# Patient Record
Sex: FEMALE | Race: WHITE | Employment: FULL TIME | URBAN - NONMETROPOLITAN AREA
[De-identification: names, ages, dates, MRNs, and addresses within clinical notes are randomized per-mention and may not be internally consistent; named-entity substitution may affect disease eponyms.]

---

## 2019-01-14 ENCOUNTER — HOSPITAL ENCOUNTER (OUTPATIENT)
Age: 34
Discharge: HOME | End: 2019-01-14
Payer: MEDICAID

## 2019-01-14 LAB
ALBUMIN SERPL-MCNC: 4.6 G/DL (ref 3.5–5.1)
ALP BLD-CCNC: 78 U/L (ref 38–126)
ALT SERPL-CCNC: 23 U/L (ref 11–66)
ANION GAP SERPL CALCULATED.3IONS-SCNC: 11 MEQ/L (ref 8–16)
AST SERPL-CCNC: 16 U/L (ref 5–40)
BILIRUB SERPL-MCNC: 0.3 MG/DL (ref 0.3–1.2)
BUN BLDV-MCNC: 11 MG/DL (ref 7–22)
CALCIUM SERPL-MCNC: 9.2 MG/DL (ref 8.5–10.5)
CHLORIDE BLD-SCNC: 102 MEQ/L (ref 98–111)
CHOLESTEROL, TOTAL: 240 MG/DL (ref 100–199)
CO2: 24 MEQ/L (ref 23–33)
CREAT SERPL-MCNC: 0.6 MG/DL (ref 0.4–1.2)
GFR SERPL CREATININE-BSD FRML MDRD: > 90 ML/MIN/1.73M2
GLUCOSE BLD-MCNC: 94 MG/DL (ref 70–108)
GLUCOSE FASTING: 94 MG/DL (ref 69–109)
HDLC SERPL-MCNC: 40 MG/DL
LDL CHOLESTEROL CALCULATED: 124 MG/DL
POTASSIUM SERPL-SCNC: 4 MEQ/L (ref 3.5–5.2)
SODIUM BLD-SCNC: 137 MEQ/L (ref 135–145)
TOTAL PROTEIN: 7.1 G/DL (ref 6.1–8)
TRIGL SERPL-MCNC: 379 MG/DL (ref 0–199)
TSH SERPL DL<=0.05 MIU/L-ACNC: 0.92 UIU/ML (ref 0.4–4.2)

## 2019-01-14 PROCEDURE — 36415 COLL VENOUS BLD VENIPUNCTURE: CPT

## 2019-01-14 PROCEDURE — 80061 LIPID PANEL: CPT

## 2019-01-14 PROCEDURE — 83525 ASSAY OF INSULIN: CPT

## 2019-01-14 PROCEDURE — 84443 ASSAY THYROID STIM HORMONE: CPT

## 2019-01-14 PROCEDURE — 80053 COMPREHEN METABOLIC PANEL: CPT

## 2019-01-15 LAB — INSULIN, RANDOM OR FASTING: NORMAL

## 2022-08-09 ENCOUNTER — HOSPITAL ENCOUNTER (OUTPATIENT)
Age: 37
Setting detail: SPECIMEN
Discharge: HOME | End: 2022-08-09

## 2022-08-09 ENCOUNTER — OFFICE VISIT (OUTPATIENT)
Dept: FAMILY MEDICINE CLINIC | Age: 37
End: 2022-08-09
Payer: COMMERCIAL

## 2022-08-09 VITALS
TEMPERATURE: 98.1 F | RESPIRATION RATE: 16 BRPM | HEART RATE: 94 BPM | WEIGHT: 242 LBS | HEIGHT: 65 IN | DIASTOLIC BLOOD PRESSURE: 78 MMHG | OXYGEN SATURATION: 98 % | BODY MASS INDEX: 40.32 KG/M2 | SYSTOLIC BLOOD PRESSURE: 106 MMHG

## 2022-08-09 DIAGNOSIS — Z00.8 ENCOUNTER FOR BIOMETRIC SCREENING: ICD-10-CM

## 2022-08-09 DIAGNOSIS — Z87.42 HISTORY OF PCOS: ICD-10-CM

## 2022-08-09 DIAGNOSIS — N91.2 AMENORRHEA: ICD-10-CM

## 2022-08-09 DIAGNOSIS — F41.9 ANXIETY: ICD-10-CM

## 2022-08-09 DIAGNOSIS — Z86.2 HISTORY OF VON WILLEBRAND'S DISEASE: ICD-10-CM

## 2022-08-09 DIAGNOSIS — Z00.00 ANNUAL PHYSICAL EXAM: Primary | ICD-10-CM

## 2022-08-09 PROCEDURE — 99385 PREV VISIT NEW AGE 18-39: CPT | Performed by: NURSE PRACTITIONER

## 2022-08-09 PROCEDURE — 36415 COLL VENOUS BLD VENIPUNCTURE: CPT | Performed by: NURSE PRACTITIONER

## 2022-08-09 SDOH — ECONOMIC STABILITY: FOOD INSECURITY: WITHIN THE PAST 12 MONTHS, THE FOOD YOU BOUGHT JUST DIDN'T LAST AND YOU DIDN'T HAVE MONEY TO GET MORE.: NEVER TRUE

## 2022-08-09 SDOH — ECONOMIC STABILITY: TRANSPORTATION INSECURITY
IN THE PAST 12 MONTHS, HAS LACK OF TRANSPORTATION KEPT YOU FROM MEETINGS, WORK, OR FROM GETTING THINGS NEEDED FOR DAILY LIVING?: NO

## 2022-08-09 SDOH — ECONOMIC STABILITY: TRANSPORTATION INSECURITY
IN THE PAST 12 MONTHS, HAS THE LACK OF TRANSPORTATION KEPT YOU FROM MEDICAL APPOINTMENTS OR FROM GETTING MEDICATIONS?: NO

## 2022-08-09 SDOH — ECONOMIC STABILITY: FOOD INSECURITY: WITHIN THE PAST 12 MONTHS, YOU WORRIED THAT YOUR FOOD WOULD RUN OUT BEFORE YOU GOT MONEY TO BUY MORE.: NEVER TRUE

## 2022-08-09 ASSESSMENT — PATIENT HEALTH QUESTIONNAIRE - PHQ9
SUM OF ALL RESPONSES TO PHQ9 QUESTIONS 1 & 2: 2
1. LITTLE INTEREST OR PLEASURE IN DOING THINGS: 0
SUM OF ALL RESPONSES TO PHQ QUESTIONS 1-9: 2
2. FEELING DOWN, DEPRESSED OR HOPELESS: 2

## 2022-08-09 ASSESSMENT — ENCOUNTER SYMPTOMS
DIARRHEA: 0
ABDOMINAL PAIN: 0
CHEST TIGHTNESS: 0
EYE DISCHARGE: 0
CONSTIPATION: 0
SHORTNESS OF BREATH: 0
RHINORRHEA: 0
COUGH: 0
VOMITING: 0

## 2022-08-09 ASSESSMENT — SOCIAL DETERMINANTS OF HEALTH (SDOH): HOW HARD IS IT FOR YOU TO PAY FOR THE VERY BASICS LIKE FOOD, HOUSING, MEDICAL CARE, AND HEATING?: NOT HARD AT ALL
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
## 2022-08-10 LAB
ABSOLUTE EOS #: 0.44 K/UL (ref 0–0.44)
ABSOLUTE IMMATURE GRANULOCYTE: 0.03 K/UL (ref 0–0.3)
ABSOLUTE LYMPH #: 3.17 K/UL (ref 1.1–3.7)
ABSOLUTE MONO #: 0.57 K/UL (ref 0.1–1.2)
ALBUMIN SERPL-MCNC: 4.1 G/DL (ref 3.5–5.2)
ALBUMIN/GLOBULIN RATIO: 1.3 (ref 1–2.5)
ALP BLD-CCNC: 72 U/L (ref 35–104)
ALT SERPL-CCNC: 44 U/L (ref 5–33)
ANION GAP SERPL CALCULATED.3IONS-SCNC: 14 MMOL/L (ref 9–17)
AST SERPL-CCNC: 26 U/L
BASOPHILS # BLD: 1 % (ref 0–2)
BASOPHILS ABSOLUTE: 0.06 K/UL (ref 0–0.2)
BILIRUB SERPL-MCNC: 0.26 MG/DL (ref 0.3–1.2)
BUN BLDV-MCNC: 11 MG/DL (ref 6–20)
CALCIUM SERPL-MCNC: 9.5 MG/DL (ref 8.6–10.4)
CHLORIDE BLD-SCNC: 103 MMOL/L (ref 98–107)
CHOLESTEROL/HDL RATIO: 7.5
CHOLESTEROL: 264 MG/DL
CO2: 21 MMOL/L (ref 20–31)
CREAT SERPL-MCNC: 0.58 MG/DL (ref 0.5–0.9)
EOSINOPHILS RELATIVE PERCENT: 5 % (ref 1–4)
ESTIMATED AVERAGE GLUCOSE: 105 MG/DL
ESTRADIOL LEVEL: 94.6 PG/ML (ref 27–314)
FOLLICLE STIMULATING HORMONE: 3.5 MIU/ML (ref 1.7–21.5)
GFR AFRICAN AMERICAN: >60 ML/MIN
GFR NON-AFRICAN AMERICAN: >60 ML/MIN
GFR SERPL CREATININE-BSD FRML MDRD: ABNORMAL ML/MIN/{1.73_M2}
GLUCOSE BLD-MCNC: 87 MG/DL (ref 70–99)
HBA1C MFR BLD: 5.3 % (ref 4–6)
HCG QUALITATIVE: NEGATIVE
HCG QUANTITATIVE: <1 MIU/ML
HCT VFR BLD CALC: 46.2 % (ref 36.3–47.1)
HDLC SERPL-MCNC: 35 MG/DL
HEMOGLOBIN: 15.3 G/DL (ref 11.9–15.1)
IMMATURE GRANULOCYTES: 0 %
INSULIN REFERENCE RANGE:: NORMAL
INSULIN: 16.5 MU/L
LDL CHOLESTEROL DIRECT: 129 MG/DL
LDL CHOLESTEROL: ABNORMAL MG/DL (ref 0–130)
LH: 5.5 MIU/ML (ref 1–95.6)
LYMPHOCYTES # BLD: 38 % (ref 24–43)
MCH RBC QN AUTO: 31.5 PG (ref 25.2–33.5)
MCHC RBC AUTO-ENTMCNC: 33.1 G/DL (ref 28.4–34.8)
MCV RBC AUTO: 95.1 FL (ref 82.6–102.9)
MONOCYTES # BLD: 7 % (ref 3–12)
NRBC AUTOMATED: 0 PER 100 WBC
PDW BLD-RTO: 12.8 % (ref 11.8–14.4)
PLATELET # BLD: 193 K/UL (ref 138–453)
PMV BLD AUTO: 12.5 FL (ref 8.1–13.5)
POTASSIUM SERPL-SCNC: 4.7 MMOL/L (ref 3.7–5.3)
RBC # BLD: 4.86 M/UL (ref 3.95–5.11)
SEG NEUTROPHILS: 49 % (ref 36–65)
SEGMENTED NEUTROPHILS ABSOLUTE COUNT: 4.11 K/UL (ref 1.5–8.1)
SEX HORMONE BINDING GLOBULIN: 29 NMOL/L (ref 30–135)
SODIUM BLD-SCNC: 138 MMOL/L (ref 135–144)
TESTOSTERONE FREE-NONMALE: 1.5 PG/ML (ref 1.3–9.2)
TESTOSTERONE TOTAL: 8 NG/DL (ref 20–70)
TOTAL PROTEIN: 7.2 G/DL (ref 6.4–8.3)
TRIGL SERPL-MCNC: 458 MG/DL
TSH SERPL DL<=0.05 MIU/L-ACNC: 1.89 UIU/ML (ref 0.3–5)
WBC # BLD: 8.4 K/UL (ref 3.5–11.3)

## 2022-08-23 ENCOUNTER — OFFICE VISIT (OUTPATIENT)
Dept: FAMILY MEDICINE CLINIC | Age: 37
End: 2022-08-23
Payer: COMMERCIAL

## 2022-08-23 VITALS
TEMPERATURE: 97.2 F | BODY MASS INDEX: 39.74 KG/M2 | HEART RATE: 90 BPM | DIASTOLIC BLOOD PRESSURE: 82 MMHG | OXYGEN SATURATION: 98 % | WEIGHT: 237 LBS | RESPIRATION RATE: 16 BRPM | SYSTOLIC BLOOD PRESSURE: 124 MMHG

## 2022-08-23 DIAGNOSIS — Z87.42 HISTORY OF PCOS: ICD-10-CM

## 2022-08-23 DIAGNOSIS — F41.9 ANXIETY: ICD-10-CM

## 2022-08-23 DIAGNOSIS — Z86.2 HISTORY OF VON WILLEBRAND'S DISEASE: ICD-10-CM

## 2022-08-23 DIAGNOSIS — E78.1 HIGH TRIGLYCERIDES: ICD-10-CM

## 2022-08-23 DIAGNOSIS — N92.6 IRREGULAR MENSTRUAL CYCLE: ICD-10-CM

## 2022-08-23 PROCEDURE — 99214 OFFICE O/P EST MOD 30 MIN: CPT | Performed by: NURSE PRACTITIONER

## 2022-08-23 PROCEDURE — G8417 CALC BMI ABV UP PARAM F/U: HCPCS | Performed by: NURSE PRACTITIONER

## 2022-08-23 PROCEDURE — 4004F PT TOBACCO SCREEN RCVD TLK: CPT | Performed by: NURSE PRACTITIONER

## 2022-08-23 PROCEDURE — G8427 DOCREV CUR MEDS BY ELIG CLIN: HCPCS | Performed by: NURSE PRACTITIONER

## 2022-08-23 RX ORDER — METAPROTERENOL SULFATE 10 MG
1 TABLET ORAL DAILY
Qty: 90 CAPSULE | Refills: 0 | Status: SHIPPED | OUTPATIENT
Start: 2022-08-23

## 2022-08-23 ASSESSMENT — ENCOUNTER SYMPTOMS
VOMITING: 0
CHEST TIGHTNESS: 0
ABDOMINAL PAIN: 0
DIARRHEA: 0
COUGH: 0
SHORTNESS OF BREATH: 0
CONSTIPATION: 0
RHINORRHEA: 0
EYE DISCHARGE: 0

## 2022-08-23 NOTE — PROGRESS NOTES
pain and palpitations. Gastrointestinal:  Negative for abdominal pain, constipation, diarrhea and vomiting. Genitourinary:  Negative for difficulty urinating and hematuria. Musculoskeletal:  Negative for arthralgias and myalgias. Skin:  Negative for rash. Neurological:  Negative for dizziness, weakness, numbness and headaches. Psychiatric/Behavioral:  The patient is nervous/anxious. Allergies   Allergen Reactions    Clindamycin/Lincomycin Hives and Nausea And Vomiting    Codeine Hives and Nausea And Vomiting       No outpatient medications prior to visit. No facility-administered medications prior to visit. Past Medical History:   Diagnosis Date    Idiopathic hypotension     Von Willebrand disease ([facility] [address]         Social History     Tobacco Use    Smoking status: Some Days     Packs/day: 0.25     Years: 11.00     Pack years: 2.75     Types: Cigarettes     Start date: 2011    Smokeless tobacco: Never   Substance Use Topics    Alcohol use: Not Currently        Past Surgical History:   Procedure Laterality Date    TONSILLECTOMY      WISDOM TOOTH EXTRACTION  2001       Family History   Problem Relation Age of Onset    Hypotension Father     Breast Cancer Paternal Grandmother        Objective       /82   Pulse 90   Temp 97.2 °F (36.2 °C) (Temporal)   Resp 16   Wt 237 lb (107.5 kg)   LMP 08/18/2022 (Exact Date)   SpO2 98%   BMI 39.74 kg/m²   Physical Exam  Vitals reviewed. Constitutional:       Appearance: She is well-developed. HENT:      Head: Normocephalic and atraumatic. Right Ear: External ear normal.      Left Ear: External ear normal.   Eyes:      Conjunctiva/sclera: Conjunctivae normal.   Neck:      Thyroid: No thyromegaly. Trachea: Trachea normal.   Cardiovascular:      Rate and Rhythm: Normal rate and regular rhythm. Heart sounds: Normal heart sounds. No murmur heard. No friction rub. No gallop.    Pulmonary:      Effort: Pulmonary effort is normal. Breath sounds: Normal breath sounds. Abdominal:      General: Bowel sounds are normal.      Palpations: Abdomen is soft. Tenderness: There is no abdominal tenderness. Musculoskeletal:         General: Normal range of motion. Cervical back: Normal range of motion and neck supple. No spinous process tenderness. Skin:     General: Skin is warm and dry. Findings: No erythema or rash. Neurological:      Mental Status: She is alert and oriented to person, place, and time. Psychiatric:         Speech: Speech normal.         Behavior: Behavior normal.         Thought Content: Thought content normal.         Data Reviewed and Summarized       Labs: Insulin, total  Order: [id number]  Status: Final result    Visible to patient: No (not released)    Next appt: Today at 11:00 AM in Family Medicine (Mon[place] , APRN - CNP)    0 Result Notes  Component Ref Range & Units 22 0003    Insulin mU/L 16.5    Insulin Reference Range:          Comment: Fastin.6-24.9   30 min:     60 min:  29-88   90 min:  26-84   120 min: [address]              Specimen Collected: 22 00:03 EDT Last Resulted: 08/10/22 06:07 EDT            Contains abnormal data Testosterone, Free  Order: [id number]  Status: Final result    Visible to patient: No (not released)    Next appt: Today at 11:00 AM in [facility] (Montegut , APRN - CNP)    0 Result Notes  Component Ref Range & Units 22 0003    Testosterone 20 - 70 ng/dL 8 Low     Sex Hormone Binding 30 - 135 nmol/L 29 Low     Testosterone, Free 1.3 - 9.2 pg/mL 1.5    Comment:  The concentration of free testosterone is derived from a mathematical expression based on   the constant for the binding of testosterone to albumin and/or sex hormone binding globulin.           [address] [facility]              Specimen Collected: 22 00:03 EDT Last Resulted: 08/10/22 10:04 EDT        Lab Flowsheet     Order Details     View Encounter           Contains abnormal data LDL Cholesterol, Direct  Order: [id number]  Status: Final result    Visible to patient: No (not released)    Next appt: Today at 11:00 AM in Family Medicine (Paras Mention, APRN - CNP)    0 Result Notes  Component Ref Range & Units 8/9/22 0003    LDL Direct <100 mg/dL 129 High     [address] [facility]              Specimen Collected: 08/09/22 00:03 EDT Last Resulted: 08/10/22 05:03 EDT        Lab Flowsheet     Order Details     View Encounter     Lab and Collection Details     Routing     Result History     View Encounter Conversation           Result Care Coordination      Patient Communication     Not Released         Contains abnormal data Lipid Panel  Order: [id number]  Status: Edited Result - FINAL    Visible to patient: No (not released)    Next appt: Today at 11:00 AM in Family Medicine (Paras Song, APRN - CNP)    Dx: Encounter for biometric screening    0 Result Notes  Component Ref Range & Units 8/9/22 0003 1/14/19 1448   Cholesterol <200 mg/dL 264 High      Comment:     Cholesterol Guidelines:       <200  Desirable    200-240  Borderline       >240  Undesirable        HDL >40 mg/dL 35 Low   40 R, CM    Comment:     HDL Guidelines:     <40     Undesirable    40-59    Borderline     >59     Desirable        LDL Cholesterol 0 - 130 mg/dL          Comment: Calculation not valid for Triglyceride value greater than 400 mg/dL. Direct LDL reflexed             LDL Guidelines:      <100    Desirable    100-129   Near to/above Desirable    130-159   Borderline       >159   Undesirable       Direct (measured) LDL and calculated LDL are not interchangeable tests.     Chol/HDL Ratio <5 7.5 High      Comment:        Triglycerides <150 mg/dL 458 High   379 High  R, CM    Comment:     Triglyceride Guidelines:      <150   Desirable    150-199  Borderline    200-499  High      >499   Very high    Based on AHA Guidelines for fasting triglyceride, October 2012. Cholesterol, Total   240 High  R, CM    LDL Calculated   124 R, CM    [address] [place] [id number] [facility]              Specimen Collected: 08/09/22 00:03 EDT Last          Contains abnormal data CBC with Auto Differential  Order: [id number]  Status: Final result    Visible to patient: No (not released)    Next appt: Today at 11:00 AM in [facility] (Oskar Nichols, APRN - CNP)    Dx: Encounter for biometric screening    0 Result Notes  Component Ref Range & Units 8/9/22 0003    WBC 3.5 - 11.3 k/uL 8.4    RBC 3.95 - 5.11 m/uL 4.86    Hemoglobin 11.9 - 15.1 g/dL 15.3 High     Hematocrit 36.3 - 47.1 % 46.2    MCV 82.6 - 102.9 fL 95.1    MCH 25.2 - 33.5 pg 31.5    MCHC 28.4 - 34.8 g/dL 33.1    RDW 11.8 - 14.4 % 12.8    Platelets 862 - 348 k/uL 193    MPV 8.1 - 13.5 fL 12.5    NRBC Automated 0.0 per 100 WBC 0.0    Seg Neutrophils 36 - 65 % 49    Lymphocytes 24 - 43 % 38    Monocytes 3 - 12 % 7    Eosinophils % 1 - 4 % 5 High     Basophils 0 - 2 % 1    Immature Granulocytes 0 % 0    Segs Absolute 1.50 - 8.10 k/uL 4.11    Absolute Lymph # 1.10 - 3.70 k/uL 3.17    Absolute Mono # 0.10 - 1.20 k/uL 0.57    Absolute Eos # 0.00 - 0.44 k/uL 0.44    Basophils Absolute 0.00 - 0.20 k/uL 0.06    Absolute Immature Granulocyte 0.00 - 0.30 k/uL 0.03    [address] [facility]              Specimen Collected: 08/09/22 00:03 EDT Last Resulted: 08/10/22 02:47 EDT        Lab Flowsheet     Order Details     View Encounter     Lab and Collection Details     Routing     Result History     View Encounter Conversation           Result Care Coordination      Patient Communication     Not Released  Not seen Back to Top           Testing Performed By:    Lab      Contains abnormal data Comprehensive Metabolic Panel  Order: [id number]  Status: Final result    Visible to patient: No (not released)    Next appt:  Today at 11:00 AM in [facility] (FIDEL Montes De Oca - CNP)    Dx: Encounter for biometric screening    0 Result Notes  Component Ref Range & Units 8/9/22 0003 1/14/19 1448 1/14/19 1448   Glucose 70 - 99 mg/dL 87   94 R    BUN 6 - 20 mg/dL 11   11 R    Creatinine 0.50 - 0.90 mg/dL 0.58   0.6 R    Calcium 8.6 - 10.4 mg/dL 9.5   9.2 R    Sodium 135 - 144 mmol/L 138   137 R    Potassium 3.7 - 5.3 mmol/L 4.7   4.0 R    Chloride 98 - 107 mmol/L 103   102 R    CO2 20 - 31 mmol/L 21   24 R    Anion Gap 9 - 17 mmol/L 14  11.0 R, CM     Alkaline Phosphatase 35 - 104 U/L 72   78 R    ALT 5 - 33 U/L 44 High    23 R, CM    AST <32 U/L 26   16 R    Total Bilirubin 0.3 - 1.2 mg/dL 0.26 Low    0.3    Total Protein 6.4 - 8.3 g/dL 7.2   7.1 R    Albumin 3.5 - 5.2 g/dL 4.1   4.6 R    Albumin/Globulin Ratio 1.0 - 2.5 1.3      GFR Non-African American >60 mL/min >60      GFR  >60 mL/min >60      GFR Comment            Comment: Average GFR for 30-36 years old:    107 mL/min/1.73sq m   Chronic Kidney Disease:    <60 mL/min/1.73sq m   Kidney failure:    <15 mL/min/1.73sq m               eGFR calculated using average adult body mass. Additional eGFR calculator available at:         [employer]          [address]        Hemoglobin A1C  Order: [id number]  Status: Final result    Visible to patient: No (not released)    Next appt: Today at 11:00 AM in Family Medicine (Hans YanelisFIDEL mendieta - CNP)    Dx: Encounter for biometric screening    0 Result Notes  Component Ref Range & Units 8/9/22 0003    Hemoglobin A1C 4.0 - 6.0 % 5.3    Estimated Avg Glucose mg/dL 105    Comment: The ADA and AACC recommend providing the estimated average glucose result to permit better   patient understanding of their HBA1c result.     [address] [facility]              Specimen Collected: 08/09/22 00:03 EDT Last Resulted: 08/10/22 08:37 EDT            TSH With Reflex Ft4  Order: [id number]  Status: Final result    Visible to patient: No (not released)    Next appt: Today at 11:00 AM in Family Medicine (FIDEL Waggoner - CNP)    Dx: Amenorrhea    0 Result Notes  Component Ref Range & Units 8/9/22 0003 1/14/19 1448   TSH 0.30 - 5.00 uIU/mL 1.89  0.921 R, CM    Resulting Agency  [address] [facility]              Specimen Collected: 08/09/22 00:03 EDT Last Resulted: 08/10/22 04:36 EDT            Follicle Stimulating Hormone  Order: [id number]  Status: Final result    Visible to patient: No (not released)    Next appt: Today at 11:00 AM in Family Medicine (FIDEL Waggoner - CNP)    Dx: Amenorrhea    0 Result Notes  Component Ref Range & Units 8/9/22 0003    FSH 1.7 - 21.5 mIU/mL 3.5    Comment: Reference Range:   Male:                        1. 5-12.4   Ovulating Female: Follicular Phase           3.5-12.5     Ovulation Phase            4.7-21.5     Luteal Phase               1.7-7.7   Postmenopausal Female:      25.8-134.8    [address] [facility]              Specimen Collected: 08/09/22 00:03 EDT         Luteinizing Hormone  Order: [id number]  Status: Final result    Visible to patient: No (not released)    Next appt: Today at 11:00 AM in Family [facility] (FIDEL Waggoner - CNP)    Dx: Amenorrhea    0 Result Notes  Component Ref Range & Units 8/9/22 0003    LH 1.0 - 95.6 mIU/mL 5.5    Comment: Reference Range:   Male:                        1.7-8.6   Ovulating Female: Follicular Phase           2.4-12.6     Ovulation Phase           14.0-95.6     Luteal Phase               1.0-11.4   Postmenopausal Female:       7.7-58.5       Estradiol  Order: [id number]  Status: Final result    Visible to patient: No (not released)    Next appt:  Today at 11:00 AM in Family Medicine (FIDEL Waggoner - CNP)    Dx: Amenorrhea    0 Result Notes  Component Ref Range & Units 8/9/22 0003    Estradiol 27 - 314 pg/mL 94.6    Comment: FEMALES:   Normally menstruating   Luteal phase                  Follicular phase              Midcycle phase                Postmenopausal (untreated)  5-50         Fulvestrant treatment will show an increased estradiol concentration with this methodology. Alternate methodologies are available upon request.             HCG, Quantitative, Pregnancy  Order: [id number]  Status: Final result    Visible to patient: No (not released)    Next appt: Today at 11:00 AM in Family Medicine (FIEDL Jade CNP)    Dx: Amenorrhea    0 Result Notes  Component Ref Range & Units 8/9/22 0003    hCG Quant <5 mIU/mL <1    Comment:     Non-preg premeno   <=5   Postmeno           <=8   Male               <=3   If HCG results do not concur with clinical observations, additional testing to confirm   results is recommended. Resulting Agency        HCG, Serum, Qualitative  Order: [id number]  Status: Final result    Visible to patient: No (not released)    Next appt: Today at 11:00 AM in [facility] (FIDEL Jade CNP)    Dx: Amenorrhea    0 Result Notes  Component Ref Range & Units 8/9/22 0003    hCG Qual NEGATIVE NEGATIVE    Comment: Specimens with hCG levels near the threshold of the test (25 mIU/mL) may give a negative or   indeterminate result. In such cases, another test should be performed with a new specimen   in 48-72 hours. If early pregnancy is suspected clinically in this setting, correlation   with quantitative serum b-hCG level is suggested. [facility] has confirmed the use of plasma for this test. This has not been cleared   or approved by the U.S. Food and Drug Administration. The FDA has determined that such   clearance is not necessary.     [address] [facility]              Specimen Collected: 08/09/22 00:03 EDT Last Resulted: 08/10/22 03:58 EDT        Lab Flowsheet                Imaging/Testing:        FIDEL Jade CNP

## 2023-06-19 ENCOUNTER — OFFICE VISIT (OUTPATIENT)
Dept: FAMILY MEDICINE CLINIC | Age: 38
End: 2023-06-19
Payer: COMMERCIAL

## 2023-06-19 ENCOUNTER — ANCILLARY PROCEDURE (OUTPATIENT)
Dept: FAMILY MEDICINE CLINIC | Age: 38
End: 2023-06-19
Payer: COMMERCIAL

## 2023-06-19 VITALS
DIASTOLIC BLOOD PRESSURE: 68 MMHG | BODY MASS INDEX: 39.07 KG/M2 | OXYGEN SATURATION: 98 % | WEIGHT: 233 LBS | HEART RATE: 89 BPM | SYSTOLIC BLOOD PRESSURE: 124 MMHG | TEMPERATURE: 98.8 F | RESPIRATION RATE: 18 BRPM

## 2023-06-19 DIAGNOSIS — L40.9 PSORIASIS: ICD-10-CM

## 2023-06-19 DIAGNOSIS — Z00.8 ENCOUNTER FOR BIOMETRIC SCREENING: ICD-10-CM

## 2023-06-19 DIAGNOSIS — N92.6 IRREGULAR MENSTRUAL CYCLE: ICD-10-CM

## 2023-06-19 DIAGNOSIS — M25.551 RIGHT HIP PAIN: ICD-10-CM

## 2023-06-19 DIAGNOSIS — M25.551 RIGHT HIP PAIN: Primary | ICD-10-CM

## 2023-06-19 DIAGNOSIS — Z87.42 HISTORY OF PCOS: ICD-10-CM

## 2023-06-19 PROCEDURE — 73502 X-RAY EXAM HIP UNI 2-3 VIEWS: CPT

## 2023-06-19 PROCEDURE — 4004F PT TOBACCO SCREEN RCVD TLK: CPT | Performed by: NURSE PRACTITIONER

## 2023-06-19 PROCEDURE — 99214 OFFICE O/P EST MOD 30 MIN: CPT | Performed by: NURSE PRACTITIONER

## 2023-06-19 PROCEDURE — G8417 CALC BMI ABV UP PARAM F/U: HCPCS | Performed by: NURSE PRACTITIONER

## 2023-06-19 PROCEDURE — G8427 DOCREV CUR MEDS BY ELIG CLIN: HCPCS | Performed by: NURSE PRACTITIONER

## 2023-06-19 RX ORDER — BACLOFEN 10 MG/1
10 TABLET ORAL 3 TIMES DAILY
Qty: 90 TABLET | Refills: 0 | Status: SHIPPED | OUTPATIENT
Start: 2023-06-19 | End: 2023-07-19

## 2023-06-19 RX ORDER — CHLORHEXIDINE GLUCONATE 0.12 MG/ML
RINSE ORAL
COMMUNITY
Start: 2023-06-14

## 2023-06-19 RX ORDER — TRIAMCINOLONE ACETONIDE 0.25 MG/G
CREAM TOPICAL
Qty: 80 G | Refills: 3 | Status: SHIPPED | OUTPATIENT
Start: 2023-06-19

## 2023-06-19 SDOH — ECONOMIC STABILITY: FOOD INSECURITY: WITHIN THE PAST 12 MONTHS, YOU WORRIED THAT YOUR FOOD WOULD RUN OUT BEFORE YOU GOT MONEY TO BUY MORE.: NEVER TRUE

## 2023-06-19 SDOH — ECONOMIC STABILITY: HOUSING INSECURITY
IN THE LAST 12 MONTHS, WAS THERE A TIME WHEN YOU DID NOT HAVE A STEADY PLACE TO SLEEP OR SLEPT IN A SHELTER (INCLUDING NOW)?: NO

## 2023-06-19 SDOH — ECONOMIC STABILITY: FOOD INSECURITY: WITHIN THE PAST 12 MONTHS, THE FOOD YOU BOUGHT JUST DIDN'T LAST AND YOU DIDN'T HAVE MONEY TO GET MORE.: NEVER TRUE

## 2023-06-19 SDOH — ECONOMIC STABILITY: INCOME INSECURITY: HOW HARD IS IT FOR YOU TO PAY FOR THE VERY BASICS LIKE FOOD, HOUSING, MEDICAL CARE, AND HEATING?: NOT HARD AT ALL

## 2023-06-19 ASSESSMENT — PATIENT HEALTH QUESTIONNAIRE - PHQ9
SUM OF ALL RESPONSES TO PHQ QUESTIONS 1-9: 0
SUM OF ALL RESPONSES TO PHQ9 QUESTIONS 1 & 2: 0
1. LITTLE INTEREST OR PLEASURE IN DOING THINGS: 0
SUM OF ALL RESPONSES TO PHQ QUESTIONS 1-9: 0
2. FEELING DOWN, DEPRESSED OR HOPELESS: 0
SUM OF ALL RESPONSES TO PHQ QUESTIONS 1-9: 0
SUM OF ALL RESPONSES TO PHQ QUESTIONS 1-9: 0

## 2023-06-19 ASSESSMENT — ENCOUNTER SYMPTOMS
DIARRHEA: 0
ABDOMINAL PAIN: 0
SHORTNESS OF BREATH: 0
BACK PAIN: 1
VOMITING: 0
COUGH: 0
EYE DISCHARGE: 0
CONSTIPATION: 0
CHEST TIGHTNESS: 0
RHINORRHEA: 0
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
## 2024-04-24 ENCOUNTER — NURSE ONLY (OUTPATIENT)
Dept: LAB | Age: 39
End: 2024-04-24

## 2024-05-04 LAB — CYTOLOGY THIN PREP PAP: NORMAL
